# Patient Record
Sex: FEMALE | Race: WHITE | URBAN - METROPOLITAN AREA
[De-identification: names, ages, dates, MRNs, and addresses within clinical notes are randomized per-mention and may not be internally consistent; named-entity substitution may affect disease eponyms.]

---

## 2019-04-10 ENCOUNTER — OFFICE VISIT (OUTPATIENT)
Dept: FAMILY MEDICINE CLINIC | Age: 74
End: 2019-04-10

## 2019-04-10 VITALS
TEMPERATURE: 98.4 F | WEIGHT: 158 LBS | HEIGHT: 65 IN | OXYGEN SATURATION: 96 % | HEART RATE: 75 BPM | BODY MASS INDEX: 26.33 KG/M2 | SYSTOLIC BLOOD PRESSURE: 135 MMHG | DIASTOLIC BLOOD PRESSURE: 77 MMHG | RESPIRATION RATE: 16 BRPM

## 2019-04-10 DIAGNOSIS — R03.0 ELEVATED BLOOD PRESSURE READING IN OFFICE WITHOUT DIAGNOSIS OF HYPERTENSION: ICD-10-CM

## 2019-04-10 DIAGNOSIS — Z76.89 ENCOUNTER TO ESTABLISH CARE: ICD-10-CM

## 2019-04-10 DIAGNOSIS — R20.9 BILATERAL COLD FEET: Primary | ICD-10-CM

## 2019-04-10 DIAGNOSIS — R41.3 MEMORY DIFFICULTY: ICD-10-CM

## 2019-04-10 NOTE — PROGRESS NOTES
Jonathan Bryant 75 y.o. female 1945 [address] [id number] [place] [address] 
[id number] Progress Note Encounter Date: 4/10/2019 Chief Complaint Patient presents with Song Establish Care  Foot Pain  
  feet are always cold, numb History provided by patient History of Present Illness Jonathan Bryant is a 75 y.o. female who presents to clinic today for: NEW PATIENT New patient who presents to establish PCP care. I personally reviewed and updated the patient's medical, surgical, family and social history. 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 Patient decided to come to [facility] due to new problems. RECORDS Provided by patient: Last 2 visit from [facility] dating from 2013. SPECIALISTS No care team member to display Reviewed with patient list of prior surgeries and allergies listed on notes from 2013. However patient denies all these allergies. Feet cold Patient present with cc of feet are cold. Patient feels that both are cold sometimes. She will awaken at night due to her feet being cold. She feels that her right toes quiver. Episodes are intermittent with no alleviating or excaerbating factors. Review of Systems Review of Systems Constitutional: Negative for chills, fever and weight loss. Cardiovascular: Negative for chest pain and palpitations. Gastrointestinal: Negative for abdominal pain, constipation, diarrhea, nausea and vomiting. Skin: Negative for itching and rash. Neurological: Positive for sensory change. Negative for dizziness, tingling, tremors, speech change, focal weakness, seizures, weakness and headaches. Vitals/Objective:  
 
Vitals:  
 04/10/19 1515 BP: 135/77 Pulse: 75 Resp: 16 Temp: 98.4 °F (36.9 °C) TempSrc: Oral  
SpO2: 96% Weight: 158 lb (71.7 kg) Height: 5' 5\" (1.651 m) Body mass index is 26.29 kg/m². Wt Readings from Last 3 Encounters:  
04/10/19 158 lb (71.7 kg) Physical Exam  
Constitutional: She appears well-developed and well-nourished. No distress. HENT:  
Head: Normocephalic and atraumatic. Right Ear: External ear normal.  
Left Ear: External ear normal.  
Cardiovascular: Normal rate and regular rhythm. No murmur heard. Pulses: 
     Dorsalis pedis pulses are 2+ on the right side, and 2+ on the left side. Posterior tibial pulses are 2+ on the right side, and 2+ on the left side. Pulmonary/Chest: Effort normal and breath sounds normal. No stridor. No respiratory distress. Musculoskeletal: Normal range of motion. She exhibits no edema or tenderness. Right foot: There is normal range of motion and no deformity. Left foot: There is normal range of motion and no deformity. Feet:  
Right Foot:  
Protective Sensation: 8 sites tested. 8 sites sensed. Left Foot:  
Protective Sensation: 8 sites tested. 8 sites sensed. Neurological: She is alert. Skin: Skin is warm. She is not diaphoretic. No erythema. No results found for this or any previous visit (from the past 24 hour(s)). Assessment and Plan:  
 
Encounter Diagnoses ICD-10-CM ICD-9-CM 1. Bilateral cold feet R20.9 782.9 2. Encounter to establish care Z76.89 V65.8 3. Elevated blood pressure reading in office without diagnosis of hypertension R03.0 796.2 4. Memory difficulty R41.3 780.93  
 
 
1. Bilateral cold feet 2. Encounter to establish care 3. Elevated blood pressure reading in office without diagnosis of hypertension Advised supportive care such as wearing socks to bed. Sensation is intact in feet. Will check blood work and vitamin levels. - CBC W/O DIFF 
- IRON PROFILE 
- METABOLIC PANEL, BASIC 
- FOLATE 
- VITAMIN B12 
- HEMOGLOBIN A1C WITH EAG 
- LIPID PANEL 
- TSH 3RD GENERATION 4. Memory difficulty Patient to return to office for testing of memory.   
 
I have discussed the diagnosis with the patient and the intended plan as seen in the above orders. she has expressed understanding. The patient has received an after-visit summary and questions were answered concerning future plans. I have discussed medication side effects and warnings with the patient as well. Electronically Signed: Chase Sadler MD 
  
History/Allergies Patients past medical, surgical and family histories were reviewed and updated. Past Medical History:  
Diagnosis Date  MRSA cellulitis History reviewed. No pertinent surgical history. Family History Problem Relation Age of Onset  No Known Problems Mother  No Known Problems Father  Lung Cancer Paternal Grandmother  Heart Attack Brother Social History Socioeconomic History  Marital status:  Spouse name: Not on file  Number of children: Not on file  Years of education: Not on file  Highest education level: Not on file Occupational History  Not on file Social Needs  Financial resource strain: Not on file  Food insecurity:  
  Worry: Not on file Inability: Not on file  Transportation needs:  
  Medical: Not on file Non-medical: Not on file Tobacco Use  Smoking status: Never Smoker  Smokeless tobacco: Never Used Substance and Sexual Activity  Alcohol use: Not on file  Drug use: Not on file  Sexual activity: Not on file Lifestyle  Physical activity:  
  Days per week: Not on file Minutes per session: Not on file  Stress: Not on file Relationships  Social connections:  
  Talks on phone: Not on file Gets together: Not on file Attends Shinto service: Not on file Active member of club or organization: Not on file Attends meetings of clubs or organizations: Not on file Relationship status: Not on file  Intimate partner violence:  
  Fear of current or ex partner: Not on file Emotionally abused: Not on file Physically abused: Not on file Forced sexual activity: Not on file Other Topics Concern  Not on file Social History Narrative  Not on file No Known Allergies Disposition Follow-up and Dispositions  · Return in about 2 weeks (around 4/24/2019) for memory tests. Future Appointments Date Time Provider Moriah Arias 4/24/2019  1:45 PM Valerie Walker MD [address] [place] [address] Current Medications after this visit No current outpatient medications on file. No current facility-administered medications for this visit. There are no discontinued medications.

## 2019-04-10 NOTE — PATIENT INSTRUCTIONS
Numbness and Tingling: Care Instructions Your Care Instructions Many things can cause numbness or tingling. Swelling may put pressure on a nerve. This could cause you to lose feeling or have a pins-and-needles sensation on part of your body. Nerves may be damaged from trauma, toxins, or diseases, such as diabetes or multiple sclerosis (MS). Sometimes, though, the cause is not clear. If there is no clear reason for your symptoms, and you are not having any other symptoms, your doctor may suggest watching and waiting for a while to see if the numbness or tingling goes away on its own. Your doctor may want you to have blood or nerve tests to find the cause of your symptoms. Follow-up care is a key part of your treatment and safety. Be sure to make and go to all appointments, and call your doctor if you are having problems. It's also a good idea to know your test results and keep a list of the medicines you take. How can you care for yourself at home? · If your doctor prescribes medicine, take it exactly as directed. Call your doctor if you think you are having a problem with your medicine. · If you have any swelling, put ice or a cold pack on the area for 10 to 20 minutes at a time. Put a thin cloth between the ice and your skin. When should you call for help? Call 911 anytime you think you may need emergency care. For example, call if: 
  · You have weakness, numbness, or tingling in both legs.  
  · You lose bowel or bladder control.  
  · You have symptoms of a stroke. These may include: 
? Sudden numbness, tingling, weakness, or loss of movement in your face, arm, or leg, especially on only one side of your body. ? Sudden vision changes. ? Sudden trouble speaking. ? Sudden confusion or trouble understanding simple statements. ? Sudden problems with walking or balance. ? A sudden, severe headache that is different from past headaches.  Watch closely for changes in your health, and be sure to contact your doctor if you have any problems, or if: 
  · You do not get better as expected. Where can you learn more? Go to http://nichole-galindo.info/. Enter I893 in the search box to learn more about \"Numbness and Tingling: Care Instructions. \" Current as of: Miley 3, 2018 Content Version: 11.9 © 5957-1353 Closely. Care instructions adapted under license by [employer] (which disclaims liability or warranty for this information). If you have questions about a medical condition or this instruction, always ask your healthcare professional. [address] any warranty or liability for your use of this information.

## 2019-04-10 NOTE — PROGRESS NOTES
1. Have you been to the ER, urgent care clinic, or been hospitalized since your last visit? no 
 
2. Have you seen or consulted any other health care providers outside of the [address] [place] [address] since your last visit? No  
 
Reviewed record in preparation for visit and have necessary documentation Opportunity was given for questions Goals that were addressed and/or need to be completed during or after this appointment include Health Maintenance Due Topic Date Due  
 DTaP/Tdap/Td series (1 - Tdap) 06/03/1965  Shingrix Vaccine Age 50> (1 of 2) 06/03/1994  BREAST CANCER SCRN MAMMOGRAM  06/03/1994  
 FOBT Q 1 YEAR AGE 50-75  06/03/1994  GLAUCOMA SCREENING Q2Y  06/03/2009  Bone Densitometry (Dexa) Screening  06/03/2009  Pneumococcal 65+ years (1 of 2 - PCV13) 06/03/2009

## 2019-04-13 LAB
BUN SERPL-MCNC: 9 MG/DL (ref 8–27)
BUN/CREAT SERPL: 12 (ref 12–28)
CALCIUM SERPL-MCNC: 9.3 MG/DL (ref 8.7–10.3)
CHLORIDE SERPL-SCNC: 103 MMOL/L (ref 96–106)
CHOLEST SERPL-MCNC: 189 MG/DL (ref 100–199)
CO2 SERPL-SCNC: 27 MMOL/L (ref 20–29)
CREAT SERPL-MCNC: 0.77 MG/DL (ref 0.57–1)
ERYTHROCYTE [DISTWIDTH] IN BLOOD BY AUTOMATED COUNT: 14.2 % (ref 12.3–15.4)
EST. AVERAGE GLUCOSE BLD GHB EST-MCNC: 103 MG/DL
FOLATE SERPL-MCNC: 12.5 NG/ML
GLUCOSE SERPL-MCNC: 96 MG/DL (ref 65–99)
HBA1C MFR BLD: 5.2 % (ref 4.8–5.6)
HCT VFR BLD AUTO: 43.5 % (ref 34–46.6)
HDLC SERPL-MCNC: 51 MG/DL
HGB BLD-MCNC: 14.4 G/DL (ref 11.1–15.9)
IRON SATN MFR SERPL: 36 % (ref 15–55)
IRON SERPL-MCNC: 84 UG/DL (ref 27–139)
LDLC SERPL CALC-MCNC: 119 MG/DL (ref 0–99)
MCH RBC QN AUTO: 30.1 PG (ref 26.6–33)
MCHC RBC AUTO-ENTMCNC: 33.1 G/DL (ref 31.5–35.7)
MCV RBC AUTO: 91 FL (ref 79–97)
PLATELET # BLD AUTO: 279 X10E3/UL (ref 150–379)
POTASSIUM SERPL-SCNC: 4.8 MMOL/L (ref 3.5–5.2)
RBC # BLD AUTO: 4.79 X10E6/UL (ref 3.77–5.28)
SODIUM SERPL-SCNC: 145 MMOL/L (ref 134–144)
TIBC SERPL-MCNC: 236 UG/DL (ref 250–450)
TRIGL SERPL-MCNC: 97 MG/DL (ref 0–149)
TSH SERPL DL<=0.005 MIU/L-ACNC: 1.85 UIU/ML (ref 0.45–4.5)
UIBC SERPL-MCNC: 152 UG/DL (ref 118–369)
VIT B12 SERPL-MCNC: 284 PG/ML (ref 232–1245)
VLDLC SERPL CALC-MCNC: 19 MG/DL (ref 5–40)
WBC # BLD AUTO: 7.3 X10E3/UL (ref 3.4–10.8)

## 2019-04-24 ENCOUNTER — OFFICE VISIT (OUTPATIENT)
Dept: FAMILY MEDICINE CLINIC | Age: 74
End: 2019-04-24

## 2019-04-24 VITALS
SYSTOLIC BLOOD PRESSURE: 120 MMHG | BODY MASS INDEX: 26.33 KG/M2 | WEIGHT: 158 LBS | OXYGEN SATURATION: 98 % | HEART RATE: 66 BPM | TEMPERATURE: 97.9 F | RESPIRATION RATE: 16 BRPM | DIASTOLIC BLOOD PRESSURE: 67 MMHG | HEIGHT: 65 IN

## 2019-04-24 DIAGNOSIS — R41.3 POOR SHORT TERM MEMORY: ICD-10-CM

## 2019-04-24 DIAGNOSIS — I95.1 ORTHOSTATIC HYPOTENSION: Primary | ICD-10-CM

## 2019-04-24 NOTE — PATIENT INSTRUCTIONS
Orthostatic Hypotension: Care Instructions  Your Care Instructions    Orthostatic hypotension is a quick drop in blood pressure. It happens when you get up from sitting or lying down. You may feel faint, lightheaded, or dizzy. When a person sits up or stands up, the body changes the way it pumps blood. This can slow the flow of blood to the brain for a very short time. And that can make you feel lightheaded. Many medicines can cause this problem, especially in older people. Lack of fluids (dehydration) or illnesses such as diabetes or heart disease also can cause it. Follow-up care is a key part of your treatment and safety. Be sure to make and go to all appointments, and call your doctor if you are having problems. It's also a good idea to know your test results and keep a list of the medicines you take. How can you care for yourself at home? · Tell your doctor about any problems you have with your medicines. · If your doctor prescribes medicine to help prevent a low blood pressure problem, take it exactly as prescribed. Call your doctor if you think you are having a problem with your medicine. · Drink plenty of fluids, enough so that your urine is light yellow or clear like water. Choose water and other caffeine-free clear liquids. If you have kidney, heart, or liver disease and have to limit fluids, talk with your doctor before you increase the amount of fluids you drink. · Limit or avoid alcohol and caffeine. · Get up slowly from bed or after sitting for a long time. If you are in bed, roll to your side and swing your legs over the edge of the bed and onto the floor. Push your body up to a sitting position. Wait for a while before you slowly stand up. If you are dizzy or lightheaded, sit or lie down. When should you call for help? Call 911 anytime you think you may need emergency care.  For example, call if:    · You passed out (lost consciousness).    Watch closely for changes in your health, and be sure to contact your doctor if:    · You do not get better as expected. Where can you learn more? Go to http://nichole-galindo.info/. Enter E268 in the search box to learn more about \"Orthostatic Hypotension: Care Instructions. \"  Current as of: July 22, 2018  Content Version: 11.9  © 7400-6459 Netotiate. Care instructions adapted under license by Medify (which disclaims liability or warranty for this information). If you have questions about a medical condition or this instruction, always ask your healthcare professional. [address] any warranty or liability for your use of this information. Learning About the Symptoms of Dementia  What is dementia? We all forget things as we get older. Many older people have a slight loss of memory that does not affect their daily lives. But memory loss that gets worse may mean that you have dementia. Dementia is a loss of mental skills that affects your daily life. It can cause problems with memory, problem-solving, and learning. It also can cause problems with thinking and planning. Dementia usually gets worse over time. But how quickly it gets worse is different for each person. Some people stay the same for years. Others lose skills quickly. Your chances of having dementia rise as you get older. But this doesn't mean that everyone will get it. What causes dementia? Dementia is caused by damage to or changes in the brain. Alzheimer's disease is the most common kind of dementia. Alzheimer's causes a steady loss of memory and how well you can speak, think, and do your daily activities. The second most common kind of dementia is caused by a series of strokes. It's called vascular dementia. It often gets worse step by step. With each new stroke, more mental skills are lost.  Serious head injuries in the past can sometimes lead to dementia, too. What are the symptoms?   Usually the first symptom of dementia is memory loss. Often the person who has the memory problem doesn't notice it, but family and friends do. People who have dementia may have increasing trouble with:  · Recalling recent events. They may forget appointments or lose objects. · Recognizing people and places. · Keeping up with conversations and activity. · Finding their way around familiar places, or driving to and from places they know well. · Keeping up personal care such as grooming or bathing. · Planning and carrying out routine tasks. They may have trouble following a recipe or writing a letter or email. What are some next steps? If you are worried about memory loss or changes in your thinking, see your doctor soon. He or she can do a physical exam and ask questions about recent and past illnesses and life events. Think about bringing someone to your doctor's appointment to take notes for you. Your doctor may suggest a series of tests to measure memory changes over time. These tests give the doctor an overall picture of how well your brain is working. The doctor can use the results to decide the best treatment program and help make your life safer and easier. It is important to know that memory loss and changes in thinking can be caused by things other than dementia. These things can include health problems such as depression, a low amount of thyroid hormone, and some infections. When those things are treated, your symptoms can get better. Follow-up care is a key part of your treatment and safety. Be sure to make and go to all appointments, and call your doctor if you are having problems. It's also a good idea to know your test results and keep a list of the medicines you take. Where can you learn more? Go to http://nichole-galindo.info/. Enter [id number] [phone] in the search box to learn more about \"Learning About the Symptoms of Dementia. \"  Current as of: September 11, 2018  Content Version: 11.9  © 9873-7662 [employer]. Care instructions adapted under license by Location (which disclaims liability or warranty for this information). If you have questions about a medical condition or this instruction, always ask your healthcare professional. [address] any warranty or liability for your use of this information.

## 2019-04-24 NOTE — PROGRESS NOTES
1. Have you been to the ER, urgent care clinic, or been hospitalized since your last visit? No     2. Have you seen or consulted any other health care providers outside of the [address] [place] [address] since your last visit?  No     Reviewed record in preparation for visit and have necessary documentation  opportunity was given for questions  Goals that were addressed and/or need to be completed during or after this appointment include   Health Maintenance Due   Topic Date Due    DTaP/Tdap/Td series (1 - Tdap) 06/03/1965    Shingrix Vaccine Age 50> (1 of 2) 06/03/1994    BREAST CANCER SCRN MAMMOGRAM  06/03/1994    FOBT Q 1 YEAR AGE 50-75  06/03/1994    GLAUCOMA SCREENING Q2Y  06/03/2009    Bone Densitometry (Dexa) Screening  06/03/2009    Pneumococcal 65+ years (1 of 2 - PCV13) 06/03/2009    MEDICARE YEARLY EXAM  04/10/2019

## 2019-04-24 NOTE — PROGRESS NOTES
Emeka Paul  75 y.o. female  1945  [address]  [address]  [id number]     [facility]: Progress Note       Encounter Date: 4/24/2019    Chief Complaint   Patient presents with    Memory Loss       History provided by patient  History of Present Illness   Emeka Paul is a 75 y.o. female who presents to clinic today for:    Memory Loss  Patient present with cc of memory loss. When said why she is concerned about her memory she responded that she gets dizzy when she goes from sitting to standing. She later reported that she does not recall details that she should. - Patient lives by herself in [place], [place]. She just returned from a 5 month stay in [place], [place]. Her  lives in [facility] living. Her son visits once a week. He lives in [facility] and daughter lives in [place].   - Reports that she does not do any activities thru the day. She has signed by [employer] once a week, mass on Sundays and the [employer]. And word finding puzzles daily. She is considering getting a part-time jopb to keep busy. Review of Systems   Review of Systems   Constitutional: Negative for chills and fever. Eyes: Negative for blurred vision, double vision, pain, discharge and redness. Gastrointestinal: Negative for abdominal pain, constipation, diarrhea, nausea and vomiting. Skin: Negative for itching and rash. Neurological: Positive for dizziness and headaches. Negative for tingling, tremors, sensory change and speech change. Psychiatric/Behavioral: Positive for memory loss. Negative for depression and suicidal ideas. The patient is not nervous/anxious and does not have insomnia.          Vitals/Objective:     Vitals:    04/24/19 1317 04/24/19 1408 04/24/19 1409 04/24/19 1410   BP: 122/64 137/72 139/67 120/67   Pulse: 66 64 67 66   Resp: 16      Temp: 97.9 °F (36.6 °C)      TempSrc: Oral      SpO2: 98%      Weight: 158 lb (71.7 kg)      Height: 5' 5\" (1.651 m) Body mass index is 26.29 kg/m². Wt Readings from Last 3 Encounters:   04/24/19 158 lb (71.7 kg)   04/10/19 158 lb (71.7 kg)       Physical Exam   Constitutional: She is oriented to person, place, and time. She appears well-developed and well-nourished. HENT:   Head: Normocephalic and atraumatic. Cardiovascular: Normal rate, regular rhythm, normal heart sounds and intact distal pulses. No murmur heard. Pulmonary/Chest: Effort normal and breath sounds normal.   Neurological: She is alert and oriented to person, place, and time. Skin: Capillary refill takes less than 2 seconds. No rash noted. No erythema. MMSE 25/20    No results found for this or any previous visit (from the past 24 hour(s)). Assessment and Plan:     Encounter Diagnoses     ICD-10-CM ICD-9-CM   1. Orthostatic hypotension I95.1 458.0   2. Poor short term memory R41.3 780.93       1. Poor short term memory  Patient declines medication at this time. She will f/u in 4-6 weeks in order to see how she is doing with community interaction. 2. Orthostatic hypotension  Discussed with patient how to move from lying flat to sitting and standing. As well as importance of hydration. I have discussed the diagnosis with the patient and the intended plan as seen in the above orders. she has expressed understanding. The patient has received an after-visit summary and questions were answered concerning future plans. I have discussed medication side effects and warnings with the patient as well. Electronically Signed: Pablo Jaquez MD     History/Allergies   Patients past medical, surgical and family histories were reviewed and updated. Past Medical History:   Diagnosis Date    MRSA cellulitis       History reviewed. No pertinent surgical history.   Family History   Problem Relation Age of Onset    No Known Problems Mother     No Known Problems Father     Lung Cancer Paternal Grandmother     Heart Attack Brother Social History     Tobacco Use    Smoking status: Never Smoker    Smokeless tobacco: Never Used   Substance Use Topics    Alcohol use: Not on file    Drug use: Not on file          No Known Allergies    Disposition     Follow-up and Dispositions  ·   Return in about 1 month (around 5/22/2019) for Memory loss. .         Future Appointments   Date Time Provider Moriah Arias   5/29/2019  1:00 PM Marti Christianson MD [facility] SCHED            Current Medications after this visit     No current outpatient medications on file. No current facility-administered medications for this visit. There are no discontinued medications.

## 2019-06-04 ENCOUNTER — OFFICE VISIT (OUTPATIENT)
Dept: FAMILY MEDICINE CLINIC | Age: 74
End: 2019-06-04

## 2019-06-04 VITALS
DIASTOLIC BLOOD PRESSURE: 58 MMHG | HEIGHT: 65 IN | OXYGEN SATURATION: 97 % | WEIGHT: 158 LBS | SYSTOLIC BLOOD PRESSURE: 120 MMHG | TEMPERATURE: 97.8 F | RESPIRATION RATE: 16 BRPM | HEART RATE: 78 BPM | BODY MASS INDEX: 26.33 KG/M2

## 2019-06-04 DIAGNOSIS — Z12.31 ENCOUNTER FOR SCREENING MAMMOGRAM FOR BREAST CANCER: ICD-10-CM

## 2019-06-04 DIAGNOSIS — R41.3 MEMORY LOSS: Primary | ICD-10-CM

## 2019-06-04 DIAGNOSIS — Z78.0 POST-MENOPAUSAL: ICD-10-CM

## 2019-06-04 NOTE — PROGRESS NOTES
Identified pt with two pt identifiers(name and ). Reviewed record in preparation for visit and have obtained necessary documentation. All patient medications has been reviewed. Chief Complaint   Patient presents with    Memory Loss       Health Maintenance Due   Topic    DTaP/Tdap/Td series (1 - Tdap)    Shingrix Vaccine Age 50> (1 of 2)    BREAST CANCER SCRN MAMMOGRAM     FOBT Q 1 YEAR AGE 50-75     GLAUCOMA SCREENING Q2Y     Bone Densitometry (Dexa) Screening     Pneumococcal 65+ years (1 of 2 - PCV13)    MEDICARE YEARLY EXAM        Vitals:    19 0936   BP: 120/58   Pulse: 78   Resp: 16   Temp: 97.8 °F (36.6 °C)   TempSrc: Oral   SpO2: 97%   Weight: 158 lb (71.7 kg)   Height: 5' 5\" (1.651 m)   PainSc:   0 - No pain       Coordination of Care Questionnaire:   1) Have you been to an emergency room, urgent care, or hospitalized since your last visit?   no       2. Have seen or consulted any other health care provider since your last visit? NO    Patient is accompanied by self I have received verbal consent from Tere Shultz to discuss any/all medical information while they are present in the room.

## 2019-06-04 NOTE — PROGRESS NOTES
Sussy Alejo  75 y.o. female  1945  [address] [address]  [id number]     [facility] MEDICINE: Progress Note       Encounter Date: 6/4/2019    Chief Complaint   Patient presents with    Memory Loss       History provided by patient  History of Present Illness   Sussy Alejo is a 75 y.o. female who presents to clinic today for:      Memory Loss  Patient present with cc of memory loss. Patient reports that she has recently return from [place] and visiting family. She has been going to the gym nightly while in [place] but has not joined one here in [place]. Health Maintenance  Asked patient to schedule wellness visit. Health Maintenance Due   Topic Date Due    DTaP/Tdap/Td series (1 - Tdap) 06/03/1965    Shingrix Vaccine Age 50> (1 of 2) 06/03/1994    BREAST CANCER SCRN MAMMOGRAM  06/03/1994    FOBT Q 1 YEAR AGE 50-75  06/03/1994    GLAUCOMA SCREENING Q2Y  06/03/2009    Bone Densitometry (Dexa) Screening  06/03/2009    Pneumococcal 65+ years (1 of 2 - PCV13) 06/03/2009    MEDICARE YEARLY EXAM  04/10/2019     Review of Systems   Review of Systems   Constitutional: Negative for chills and fever. Cardiovascular: Negative for chest pain and palpitations. Gastrointestinal: Negative for abdominal pain, diarrhea, nausea and vomiting. Genitourinary: Negative for dysuria, frequency and urgency. Skin: Negative for itching and rash. Neurological: Negative for dizziness, focal weakness, seizures, loss of consciousness, weakness and headaches. Psychiatric/Behavioral: Positive for memory loss. Negative for depression and suicidal ideas. The patient is not nervous/anxious and does not have insomnia. Vitals/Objective:     Vitals:    06/04/19 0936   BP: 120/58   Pulse: 78   Resp: 16   Temp: 97.8 °F (36.6 °C)   TempSrc: Oral   SpO2: 97%   Weight: 158 lb (71.7 kg)   Height: 5' 5\" (1.651 m)     Body mass index is 26.29 kg/m².     Wt Readings from Last 3 Encounters:   06/04/19 158 lb (71.7 kg)   04/24/19 158 lb (71.7 kg)   04/10/19 158 lb (71.7 kg)       Physical Exam   Constitutional: She is oriented to person, place, and time. She appears well-developed and well-nourished. HENT:   Head: Normocephalic and atraumatic. Right Ear: External ear normal.   Left Ear: External ear normal.   Cardiovascular: Normal rate and regular rhythm. No murmur heard. Pulmonary/Chest: Effort normal and breath sounds normal.   Neurological: She is alert and oriented to person, place, and time. Skin: Skin is warm. No results found for this or any previous visit (from the past 24 hour(s)). Assessment and Plan:     Encounter Diagnoses     ICD-10-CM ICD-9-CM   1. Memory loss R41.3 780.93   2. Post-menopausal Z78.0 V49.81   3. Encounter for screening mammogram for breast cancer Z12.31 V76.12       1. Memory loss  Patient's MMSE improved from last check. Urged her to look into nearby gyms or other social activities. Set a goal for at least 1 activity prior to f/u in 2 weeks for wellness. 2. Post-menopausal  - DEXA BONE DENSITY STUDY AXIAL; Future    3. Encounter for screening mammogram for breast cancer  - BRENDEN MAMMO BI SCREENING INCL CAD; Future      I have discussed the diagnosis with the patient and the intended plan as seen in the above orders. she has expressed understanding. The patient has received an after-visit summary and questions were answered concerning future plans. I have discussed medication side effects and warnings with the patient as well. Electronically Signed: Patrice Yang MD     History/Allergies   Patients past medical, surgical and family histories were reviewed and updated. Past Medical History:   Diagnosis Date    MRSA cellulitis       History reviewed. No pertinent surgical history.   Family History   Problem Relation Age of Onset    No Known Problems Mother     No Known Problems Father     Lung Cancer Paternal Grandmother    Mykel Small Heart Attack Brother      Social History     Tobacco Use    Smoking status: Never Smoker    Smokeless tobacco: Never Used   Substance Use Topics    Alcohol use: Not Currently    Drug use: Never          No Known Allergies    Disposition     Follow-up and Dispositions  ·   Return in about 2 weeks (around 6/18/2019) for Please schedule appointment for Medicare Wellness. Future Appointments   Date Time Provider Moriah Arias   6/25/2019  8:50 AM Hlavac, Joaquim Dandy, MD [facility] SCHED            Current Medications after this visit     No current outpatient medications on file. No current facility-administered medications for this visit. There are no discontinued medications.

## 2019-06-25 ENCOUNTER — OFFICE VISIT (OUTPATIENT)
Dept: FAMILY MEDICINE CLINIC | Age: 74
End: 2019-06-25

## 2019-06-25 VITALS
RESPIRATION RATE: 16 BRPM | TEMPERATURE: 97.8 F | WEIGHT: 161 LBS | DIASTOLIC BLOOD PRESSURE: 74 MMHG | HEART RATE: 65 BPM | BODY MASS INDEX: 26.82 KG/M2 | HEIGHT: 65 IN | OXYGEN SATURATION: 96 % | SYSTOLIC BLOOD PRESSURE: 131 MMHG

## 2019-06-25 DIAGNOSIS — Z13.39 SCREENING FOR ALCOHOLISM: ICD-10-CM

## 2019-06-25 DIAGNOSIS — Z00.00 MEDICARE ANNUAL WELLNESS VISIT, SUBSEQUENT: Primary | ICD-10-CM

## 2019-06-25 DIAGNOSIS — Z12.11 SCREENING FOR MALIGNANT NEOPLASM OF COLON: ICD-10-CM

## 2019-06-25 DIAGNOSIS — Z71.89 ADVANCED DIRECTIVES, COUNSELING/DISCUSSION: ICD-10-CM

## 2019-06-25 DIAGNOSIS — Z23 ENCOUNTER FOR IMMUNIZATION: ICD-10-CM

## 2019-06-25 DIAGNOSIS — Z13.31 SCREENING FOR DEPRESSION: ICD-10-CM

## 2019-06-25 DIAGNOSIS — Z13.5 SCREENING FOR GLAUCOMA: ICD-10-CM

## 2019-06-25 DIAGNOSIS — Z12.39 ENCOUNTER FOR SCREENING FOR MALIGNANT NEOPLASM OF BREAST: ICD-10-CM

## 2019-06-25 PROBLEM — R03.0 ELEVATED BLOOD PRESSURE READING IN OFFICE WITHOUT DIAGNOSIS OF HYPERTENSION: Status: ACTIVE | Noted: 2019-06-25

## 2019-06-25 PROBLEM — I95.1 ORTHOSTATIC HYPOTENSION: Status: ACTIVE | Noted: 2019-06-25

## 2019-06-25 NOTE — ACP (ADVANCE CARE PLANNING)
Advance Care Planning    Advance Care Planning (ACP) Provider Conversation Snapshot    Date of ACP Conversation: 06/25/19  Persons included in Conversation:  patient  Length of ACP Conversation in minutes:  <16 minutes (Non-Billable)    Authorized Decision Maker (if patient is incapable of making informed decisions): This person is:    Other Legally Authorized Decision Maker (e.g. Next of Kin)  ary Blake [phone]        For Patients with Decision Making Capacity:   Values/Goals: Exploration of values, goals, and preferences if recovery is not expected, even with continued medical treatment in the event of:  Imminent death  Severe, permanent brain injury    Conversation Outcomes / Follow-Up Plan:   Recommended completion of Advance Directive form after review of ACP materials and conversation with prospective healthcare agent

## 2019-06-25 NOTE — PROGRESS NOTES
1. Have you been to the ER, urgent care clinic, or been hospitalized since your last visit? No     2. Have you seen or consulted any other health care providers outside of the [address] [place] [address] since your last visit?    No     Reviewed record in preparation for visit and have necessary documentation  opportunity was given for questions  Goals that were addressed and/or need to be completed during or after this appointment include   Health Maintenance Due   Topic Date Due    DTaP/Tdap/Td series (1 - Tdap) 06/03/1965    Shingrix Vaccine Age 50> (1 of 2) 06/03/1994    BREAST CANCER SCRN MAMMOGRAM  06/03/1994    FOBT Q 1 YEAR AGE 50-75  06/03/1994    GLAUCOMA SCREENING Q2Y  06/03/2009    Bone Densitometry (Dexa) Screening  06/03/2009    Pneumococcal 65+ years (1 of 2 - PCV13) 06/03/2009    MEDICARE YEARLY EXAM  04/10/2019

## 2019-06-25 NOTE — PROGRESS NOTES
This is an Initial Medicare Annual Wellness Exam (AWV) (Performed 12 months after IPPE or effective date of Medicare Part B enrollment, Once in a lifetime)    I have reviewed the patient's medical history in detail and updated the computerized patient record. History     Past Medical History:   Diagnosis Date    MRSA cellulitis       History reviewed. No pertinent surgical history. No Known Allergies  Family History   Problem Relation Age of Onset    No Known Problems Mother     No Known Problems Father     Lung Cancer Paternal Grandmother     Heart Attack Brother      Social History     Tobacco Use    Smoking status: Never Smoker    Smokeless tobacco: Never Used   Substance Use Topics    Alcohol use: Not Currently     Patient Active Problem List   Diagnosis Code    Orthostatic hypotension I95.1    Elevated blood pressure reading in office without diagnosis of hypertension R03.0       Depression Risk Factor Screening:     3 most recent PHQ Screens 6/25/2019   Little interest or pleasure in doing things Not at all   Feeling down, depressed, irritable, or hopeless Not at all   Total Score PHQ 2 0     Alcohol Risk Factor Screening: You do not drink alcohol or very rarely. Functional Ability and Level of Safety:   Hearing Loss  Hearing is good.     Activities of Daily Living  ADL Assessment 6/25/2019   Feeding yourself No Help Needed   Getting from bed to chair No Help Needed   Getting dressed No Help Needed   Bathing or showering No Help Needed   Walk across the room (includes cane/walker) No Help Needed   Using the telphone No Help Needed   Taking your medications No Help Needed   Preparing meals No Help Needed   Managing money (expenses/bills) No Help Needed   Moderately strenuous housework (laundry) No Help Needed   Shopping for personal items (toiletries/medicines) No Help Needed   Shopping for groceries No Help Needed   Driving No Help Needed   Climbing a flight of stairs No Help Needed Getting to places beyond walking distances No Help Needed       Fall Risk  Fall Risk Assessment, last 12 mths 6/25/2019   Able to walk? Yes   Fall in past 12 months? No   Fall with injury? -   Number of falls in past 12 months -   Fall Risk Score -       Abuse Screen  Abuse Screening Questionnaire 6/25/2019   Do you ever feel afraid of your partner? N   Are you in a relationship with someone who physically or mentally threatens you? N   Is it safe for you to go home? Y       Cognitive Screening   Evaluation of Cognitive Function:  Has your family/caregiver stated any concerns about your memory: no  Normal, MMSE    Patient Care Team   Patient Care Team:  Po Padgett MD as PCP - General (Family Practice)  Assessment/Plan   Education and counseling provided:  Are appropriate based on today's review and evaluation  End-of-Life planning (with patient's consent)  Pneumococcal Vaccine  Screening Mammography  Bone mass measurement (DEXA)  Screening for glaucoma    Diagnoses and all orders for this visit:    1. Screening for malignant neoplasm of colon  -     REFERRAL TO GASTROENTEROLOGY    2. Encounter for immunization  -     diphtheria-pertussis, acellular,-tetanus (BOOSTRIX TDAP) 2.5-8-5 Lf-mcg-Lf/0.5mL susp susp; 0.5 mL by IntraMUSCular route once for 1 dose. Please fax confirmation to the attn of prescribing provider at fax number listed above. Indications: Treatment to Prevent Diphtheria, Pertussis and Tetanus  -     varicella-zoster recombinant, PF, (SHINGRIX, PF,) 50 mcg/0.5 mL susr injection; 0.5 mL by IntraMuscular for 2 doses at least 2 months apart. Please fax confirmation to the attn of prescribing provider at above fax number. Indications: Prevention of Shingles  -     pneumococcal 13 issac conj dip (PREVNAR-13) 0.5 mL syrg injection; 0.5 mL by IntraMUSCular route once for 1 dose. To be administered at pharmacy. Please fax confirmation to the attn of prescribing provider at the above fax number.     3. Encounter for screening for malignant neoplasm of breast  -     BRENDEN MAMMO BI SCREENING INCL CAD; Future    4. Screening for glaucoma  -     REFERRAL TO OPHTHALMOLOGY    5. Medicare annual wellness visit, subsequent    6. Advanced directives, counseling/discussion    7. Screening for alcoholism  -     DC ANNUAL ALCOHOL SCREEN 15 MIN    8.  Screening for depression  -     [address] Maintenance Topics with due status: Overdue       Topic Date Due    DTaP/Tdap/Td series 06/03/1965    Shingrix Vaccine Age 50> 06/03/1994    BREAST CANCER SCRN MAMMOGRAM 06/03/1994    GLAUCOMA SCREENING Q2Y 06/03/2009    Bone Densitometry (Dexa) Screening 06/03/2009    Pneumococcal 65+ years 06/03/2009    COLONOSCOPY 12/31/2013    MEDICARE YEARLY EXAM 04/10/2019     Health Maintenance Topics with due status: Not Due       Topic Last Completion Date    Influenza Age 5 to Adult Not Due

## 2019-06-25 NOTE — PATIENT INSTRUCTIONS
If you prefer this imaging at an alternate location, please call to schedule: 
 
JOL[facility] (Deepika or Lesly Shoemaker)- [phone] [facility]- [phone] Rahul KIMXN-[phone] Jose [place] [address] [phone] [facility]- [phone] Medicare Wellness Visit, Female The best way to live healthy is to have a lifestyle where you eat a well-balanced diet, exercise regularly, limit alcohol use, and quit all forms of tobacco/nicotine, if applicable. Regular preventive services are another way to keep healthy. Preventive services (vaccines, screening tests, monitoring & exams) can help personalize your care plan, which helps you manage your own care. Screening tests can find health problems at the earliest stages, when they are easiest to treat. [facility] follows the current, evidence-based guidelines published by the [facility] ([facility]) when recommending preventive services for our patients. Because we follow these guidelines, sometimes recommendations change over time as research supports it. (For example, mammograms used to be recommended annually. Even though Medicare will still pay for an annual mammogram, the newer guidelines recommend a mammogram every two years for women of average risk.) Of course, you and your doctor may decide to screen more often for some diseases, based on your risk and your health status. Preventive services for you include: - Medicare offers their members a free annual wellness visit, which is time for you and your primary care provider to discuss and plan for your preventive service needs. Take advantage of this benefit every year! 
-All adults over the age of 72 should receive the recommended pneumonia vaccines.  Current USPSTF guidelines recommend a series of two vaccines for the best pneumonia protection.  
-All adults should have a flu vaccine yearly and a tetanus vaccine every 10 years. All adults age 61 and older should receive a shingles vaccine once in their lifetime.   
-A bone mass density test is recommended when a woman turns 65 to screen for osteoporosis. This test is only recommended one time, as a screening. Some providers will use this same test as a disease monitoring tool if you already have osteoporosis. -All adults age 38-68 who are overweight should have a diabetes screening test once every three years.  
-Other screening tests and preventive services for persons with diabetes include: an eye exam to screen for diabetic retinopathy, a kidney function test, a foot exam, and stricter control over your cholesterol.  
-Cardiovascular screening for adults with routine risk involves an electrocardiogram (ECG) at intervals determined by your doctor.  
-Colorectal cancer screenings should be done for adults age 54-65 with no increased risk factors for colorectal cancer. There are a number of acceptable methods of screening for this type of cancer. Each test has its own benefits and drawbacks. Discuss with your doctor what is most appropriate for you during your annual wellness visit. The different tests include: colonoscopy (considered the best screening method), a fecal occult blood test, a fecal DNA test, and sigmoidoscopy. -Breast cancer screenings are recommended every other year for women of normal risk, age 54-69. 
-Cervical cancer screenings for women over age 72 are only recommended with certain risk factors.  
-All adults born [facility] should be screened once for Hepatitis C. Here is a list of your current Health Maintenance items (your personalized list of preventive services) with a due date: 
Health Maintenance Due Topic Date Due  
 DTaP/Tdap/Td  (1 - Tdap) 06/03/1965  Shingles Vaccine (1 of 2) 06/03/1994  Mammogram  06/03/1994  Glaucoma Screening   06/03/2009  Bone Mineral Density   06/03/2009  Pneumococcal Vaccine (1 of 2 - PCV13) 06/03/2009  Colonoscopy  12/31/2013 [facility] Annual Well Visit  04/10/2019 Colon Cancer Screening: Care Instructions Your Care Instructions Colorectal cancer occurs in the colon or rectum. That's the lower part of your digestive system. It is the second-leading cause of cancer deaths in the [place]. It often starts with small growths called polyps in the colon or rectum. Polyps are usually found with screening tests. Depending on the type of test, any polyps found may be removed during the tests. Colorectal cancer usually does not cause symptoms at first. But regular tests can help find it early, before it spreads and becomes harder to treat. Experts advise routine tests for colon cancer for people starting at age 48. And they advise people with a higher risk of colon cancer to get tested sooner. Talk with your doctor about when you should start testing. Discuss which tests you need. Follow-up care is a key part of your treatment and safety. Be sure to make and go to all appointments, and call your doctor if you are having problems. It's also a good idea to know your test results and keep a list of the medicines you take. What are the main screening tests for colon cancer? · Stool tests. These include the fecal immunochemical test (FIT) and the fecal occult blood test (FOBT). These tests check stool samples for signs of cancer. If your test is positive, you will need to have a colonoscopy. · Sigmoidoscopy. This test lets your doctor look at the lining of your rectum and the lowest part of your colon. Your doctor uses a lighted tube called a sigmoidoscope. This test can't find cancers or polyps in the upper part of your colon. In some cases, polyps that are found can be removed. But if your doctor finds polyps, you will need to have a colonoscopy to check the upper part of your colon. · Colonoscopy.  This test lets your doctor look at the lining of your rectum and your entire colon. The doctor uses a thin, flexible tool called a colonoscope. It can also be used to remove polyps or get a tissue sample (biopsy). What tests do you need? The following guidelines are for people age 48 and over who are not at high risk for colorectal cancer. You may have at least one of these tests as directed by your doctor. · Fecal immunochemical test (FIT) or fecal occult blood test (FOBT) every year · Sigmoidoscopy every 5 years · Colonoscopy every 10 years If you are age 68 to 80, you can work with your doctor to decide if screening is a good option. If you are age 80 or older, your doctor will likely advise that screening is not helpful. Talk with your doctor about when you need to be tested. And discuss which tests are right for you. Your doctor may recommend earlier or more frequent testing if you: 
· Have had colorectal cancer before. · Have had colon polyps. · Have symptoms of colorectal cancer. These include blood in your stool and changes in your bowel habits. · Have a parent, brother or sister, or child with colon polyps or colorectal cancer. · Have a bowel disease. This includes ulcerative colitis and Crohn's disease. · Have a rare polyp syndrome that runs in families, such as familial adenomatous polyposis (FAP). · Have had radiation treatments to the belly or pelvis. When should you call for help? Watch closely for changes in your health, and be sure to contact your doctor if: 
  · You have any changes in your bowel habits.  
  · You have any problems. Where can you learn more? Go to http://nichole-galindo.info/. Enter M541 in the search box to learn more about \"Colon Cancer Screening: Care Instructions. \" Current as of: March 27, 2018 Content Version: 11.9 © 7353-6637 [employer].  Care instructions adapted under license by [employer] (which disclaims liability or warranty for this information). If you have questions about a medical condition or this instruction, always ask your healthcare professional. [place] [address] any warranty or liability for your use of this information.

## 2019-06-25 NOTE — LETTER
6/25/2019 9:13 AM 
 
Ms. Opal Bella [address] [address] Your physician has referred you for imaging. If you would like this done at a [facility] [place] [facility] facility, a member of the [facility] Patient Care Team will contact you within 24 hours to schedule. If you do not hear from a team member, please call [phone] to speak with this team directly to schedule your imaging. If you prefer this imaging at an alternate location, please call to schedule: [facility]- [phone] [facility]- [phone] [facility]- [phone] [employer] (Deepika or Sara Echeverria)- [phone] [facility]- [phone] [facility]-[phone] [place] [address] [phone] [facility] [phone] Sincerely, 
 
 
Jonn Chiang MD